# Patient Record
Sex: FEMALE | Race: WHITE
[De-identification: names, ages, dates, MRNs, and addresses within clinical notes are randomized per-mention and may not be internally consistent; named-entity substitution may affect disease eponyms.]

---

## 2019-04-09 ENCOUNTER — HOSPITAL ENCOUNTER (EMERGENCY)
Dept: HOSPITAL 47 - EC | Age: 24
Discharge: HOME | End: 2019-04-09
Payer: COMMERCIAL

## 2019-04-09 VITALS — SYSTOLIC BLOOD PRESSURE: 118 MMHG | DIASTOLIC BLOOD PRESSURE: 70 MMHG | TEMPERATURE: 98 F | HEART RATE: 88 BPM

## 2019-04-09 VITALS — RESPIRATION RATE: 16 BRPM

## 2019-04-09 DIAGNOSIS — Z3A.08: ICD-10-CM

## 2019-04-09 DIAGNOSIS — O20.0: Primary | ICD-10-CM

## 2019-04-09 LAB
APTT BLD: 26 SEC (ref 22–30)
BASOPHILS # BLD AUTO: 0 K/UL (ref 0–0.2)
BASOPHILS NFR BLD AUTO: 0 %
EOSINOPHIL # BLD AUTO: 0.2 K/UL (ref 0–0.7)
EOSINOPHIL NFR BLD AUTO: 2 %
ERYTHROCYTE [DISTWIDTH] IN BLOOD BY AUTOMATED COUNT: 4.26 M/UL (ref 3.8–5.4)
ERYTHROCYTE [DISTWIDTH] IN BLOOD: 13.2 % (ref 11.5–15.5)
HCT VFR BLD AUTO: 38.6 % (ref 34–46)
HGB BLD-MCNC: 13.1 GM/DL (ref 11.4–16)
INR PPP: 1 (ref ?–1.2)
LYMPHOCYTES # SPEC AUTO: 2.7 K/UL (ref 1–4.8)
LYMPHOCYTES NFR SPEC AUTO: 33 %
MCH RBC QN AUTO: 30.6 PG (ref 25–35)
MCHC RBC AUTO-ENTMCNC: 33.8 G/DL (ref 31–37)
MCV RBC AUTO: 90.6 FL (ref 80–100)
MONOCYTES # BLD AUTO: 0.5 K/UL (ref 0–1)
MONOCYTES NFR BLD AUTO: 6 %
NEUTROPHILS # BLD AUTO: 4.7 K/UL (ref 1.3–7.7)
NEUTROPHILS NFR BLD AUTO: 57 %
PH UR: 6 [PH] (ref 5–8)
PLATELET # BLD AUTO: 209 K/UL (ref 150–450)
PT BLD: 10.3 SEC (ref 9–12)
SP GR UR: 1.02 (ref 1–1.03)
UROBILINOGEN UR QL STRIP: <2 MG/DL (ref ?–2)
WBC # BLD AUTO: 8.2 K/UL (ref 3.8–10.6)

## 2019-04-09 PROCEDURE — 76801 OB US < 14 WKS SINGLE FETUS: CPT

## 2019-04-09 PROCEDURE — 86901 BLOOD TYPING SEROLOGIC RH(D): CPT

## 2019-04-09 PROCEDURE — 81025 URINE PREGNANCY TEST: CPT

## 2019-04-09 PROCEDURE — 99284 EMERGENCY DEPT VISIT MOD MDM: CPT

## 2019-04-09 PROCEDURE — 85730 THROMBOPLASTIN TIME PARTIAL: CPT

## 2019-04-09 PROCEDURE — 85610 PROTHROMBIN TIME: CPT

## 2019-04-09 PROCEDURE — 81003 URINALYSIS AUTO W/O SCOPE: CPT

## 2019-04-09 PROCEDURE — 84702 CHORIONIC GONADOTROPIN TEST: CPT

## 2019-04-09 PROCEDURE — 86900 BLOOD TYPING SEROLOGIC ABO: CPT

## 2019-04-09 PROCEDURE — 87086 URINE CULTURE/COLONY COUNT: CPT

## 2019-04-09 PROCEDURE — 85025 COMPLETE CBC W/AUTO DIFF WBC: CPT

## 2019-04-09 PROCEDURE — 76817 TRANSVAGINAL US OBSTETRIC: CPT

## 2019-04-09 PROCEDURE — 36415 COLL VENOUS BLD VENIPUNCTURE: CPT

## 2019-04-09 NOTE — ED
Female Urogenital HPI





- General


Chief complaint: Vaginal Bleeding


Stated complaint: 7wks preg, bleeding


Time Seen by Provider: 19 17:39


Source: patient, RN notes reviewed, old records reviewed


Mode of arrival: ambulatory


Limitations: no limitations





- History of Present Illness


Initial comments: 





This is a 23-year-old female the ER for evaluation.  Patient is a  believed

to be about 6-8 weeks pregnant.  Prior ultrasound.  Patient has had a prior 

ultrasound which did show what she believes to be a fetal heart rate up to 120s.

 Patient presented today for vaginal bleeding cramping that started today.


MD Complaint: vaginal bleeding (positive pregnancy)


-: hour(s)


Location: suprapubic (cramping)


Severity: mild


Severity scale (1-10): 2


Quality: cramping


Consistency: intermittent, now resolved


Worsens with: none


Last Menstrual Period: 19


Patient Pregnant: Yes


Associated Symptoms: vaginal bleeding, abdominal pain





- Related Data


                                Home Medications











 Medication  Instructions  Recorded  Confirmed


 


Pnv No.95/Ferrous Fum/Folic AC 1 tab PO DAILY 19





[Prenatal Multivitamin Tablet]   


 


metroNIDAZOLE 0.75% VAGINAL 1 applic VAGINAL HS 19





[Metrogel Vaginal]   











                                    Allergies











Allergy/AdvReac Type Severity Reaction Status Date / Time


 


No Known Allergies Allergy   Verified 19 17:45














Review of Systems


ROS Statement: 


Those systems with pertinent positive or pertinent negative responses have been 

documented in the HPI.





ROS Other: All systems not noted in ROS Statement are negative.





Past Medical History


Past Medical History: No Reported History


Additional Past Medical History / Comment(s): UTI


History of Any Multi-Drug Resistant Organisms: None Reported


Past Surgical History: No Surgical Hx Reported


Past Psychological History: Depression


Smoking Status: Never smoker


Past Alcohol Use History: None Reported


Past Drug Use History: None Reported





General Exam


Limitations: no limitations


General appearance: alert, in no apparent distress


Head exam: Present: atraumatic, normocephalic, normal inspection


Eye exam: Present: normal appearance, PERRL, EOMI.  Absent: scleral icterus, 

conjunctival injection, periorbital swelling


ENT exam: Present: normal exam, mucous membranes moist


Neck exam: Present: normal inspection.  Absent: tenderness, meningismus, 

lymphadenopathy


Respiratory exam: Present: normal lung sounds bilaterally.  Absent: respiratory 

distress, wheezes, rales, rhonchi, stridor


Cardiovascular Exam: Present: regular rate, normal rhythm, normal heart sounds. 

Absent: systolic murmur, diastolic murmur, rubs, gallop, clicks


GI/Abdominal exam: Present: soft, normal bowel sounds.  Absent: distended, 

tenderness, guarding, rebound, rigid


Extremities exam: Present: normal inspection, full ROM, normal capillary refill.

 Absent: tenderness, pedal edema, joint swelling, calf tenderness


Back exam: Present: normal inspection


Neurological exam: Present: alert, oriented X3, CN II-XII intact


Psychiatric exam: Present: normal affect, normal mood


Skin exam: Present: warm, dry, intact, normal color.  Absent: rash





Course


                                   Vital Signs











  19





  17:33


 


Temperature 98.1 F


 


Pulse Rate 95


 


Respiratory 16





Rate 


 


Blood Pressure 113/74


 


O2 Sat by Pulse 98





Oximetry 














Medical Decision Making





- Medical Decision Making





23 female the ER for evaluation of vaginal bleeding in pregnancy.  Patient can 

be discharged home positive IUP on ultrasound.  Labwork and UA negative





- Lab Data


Result diagrams: 


                                 19 18:12





                                   Lab Results











  19 Range/Units





  18:12 18:12 18:12 


 


WBC     (3.8-10.6)  k/uL


 


RBC     (3.80-5.40)  m/uL


 


Hgb     (11.4-16.0)  gm/dL


 


Hct     (34.0-46.0)  %


 


MCV     (80.0-100.0)  fL


 


MCH     (25.0-35.0)  pg


 


MCHC     (31.0-37.0)  g/dL


 


RDW     (11.5-15.5)  %


 


Plt Count     (150-450)  k/uL


 


Neutrophils %     %


 


Lymphocytes %     %


 


Monocytes %     %


 


Eosinophils %     %


 


Basophils %     %


 


Neutrophils #     (1.3-7.7)  k/uL


 


Lymphocytes #     (1.0-4.8)  k/uL


 


Monocytes #     (0-1.0)  k/uL


 


Eosinophils #     (0-0.7)  k/uL


 


Basophils #     (0-0.2)  k/uL


 


PT     (9.0-12.0)  sec


 


INR     (<1.2)  


 


APTT     (22.0-30.0)  sec


 


HCG, Quant    17705.3  mIU/mL


 


Urine Color     


 


Urine Appearance     (Clear)  


 


Urine pH     (5.0-8.0)  


 


Ur Specific Gravity     (1.001-1.035)  


 


Urine Protein     (Negative)  


 


Urine Glucose (UA)     (Negative)  


 


Urine Ketones     (Negative)  


 


Urine Blood     (Negative)  


 


Urine Nitrite     (Negative)  


 


Urine Bilirubin     (Negative)  


 


Urine Urobilinogen     (<2.0)  mg/dL


 


Ur Leukocyte Esterase     (Negative)  


 


Urine HCG, Qual   Detected   (Not Detectd)  


 


Blood Type  O Positive    


 


Blood Type Recheck  [facility] ONLY    














  19 Range/Units





  18:12 18:12 18:12 


 


WBC   8.2   (3.8-10.6)  k/uL


 


RBC   4.26   (3.80-5.40)  m/uL


 


Hgb   13.1   (11.4-16.0)  gm/dL


 


Hct   38.6   (34.0-46.0)  %


 


MCV   90.6   (80.0-100.0)  fL


 


MCH   30.6   (25.0-35.0)  pg


 


MCHC   33.8   (31.0-37.0)  g/dL


 


RDW   13.2   (11.5-15.5)  %


 


Plt Count   209   (150-450)  k/uL


 


Neutrophils %   57   %


 


Lymphocytes %   33   %


 


Monocytes %   6   %


 


Eosinophils %   2   %


 


Basophils %   0   %


 


Neutrophils #   4.7   (1.3-7.7)  k/uL


 


Lymphocytes #   2.7   (1.0-4.8)  k/uL


 


Monocytes #   0.5   (0-1.0)  k/uL


 


Eosinophils #   0.2   (0-0.7)  k/uL


 


Basophils #   0.0   (0-0.2)  k/uL


 


PT    10.3  (9.0-12.0)  sec


 


INR    1.0  (<1.2)  


 


APTT    26.0  (22.0-30.0)  sec


 


HCG, Quant     mIU/mL


 


Urine Color  Yellow    


 


Urine Appearance  Clear    (Clear)  


 


Urine pH  6.0    (5.0-8.0)  


 


Ur Specific Gravity  1.019    (1.001-1.035)  


 


Urine Protein  Negative    (Negative)  


 


Urine Glucose (UA)  Negative    (Negative)  


 


Urine Ketones  Negative    (Negative)  


 


Urine Blood  Negative    (Negative)  


 


Urine Nitrite  Negative    (Negative)  


 


Urine Bilirubin  Negative    (Negative)  


 


Urine Urobilinogen  <2.0    (<2.0)  mg/dL


 


Ur Leukocyte Esterase  Negative    (Negative)  


 


Urine HCG, Qual     (Not Detectd)  


 


Blood Type     


 


Blood Type Recheck     














- Radiology Data


Radiology results: report reviewed (US shows IUP), image reviewed





Disposition


Clinical Impression: 


 Threatened 





Disposition: HOME SELF-CARE


Condition: Good


Instructions (If sedation given, give patient instructions):  Threatened 

Miscarriage (ED)


Is patient prescribed a controlled substance at d/c from ED?: No


Referrals: 


Colton Aparicio Jr,  [Primary Care Provider] - 1-2 days

## 2019-04-09 NOTE — US
EXAMINATION TYPE: Transabdominal

 

DATE OF EXAM: 4/9/2019 7:04 PM

 

COMPARISON: NONE

 

CLINICAL HISTORY: pain. Spotting.

 

EXAM PERFORMED:  Transvaginal (TV) and Transabdominal (TA)

 

EXAM MEASUREMENTS:

 

GESTATIONAL AGE / DATING

Physician Established: 11/25/2019 7w 1day  

Dates by First Scan:  No previous this is first scan 

Dates by Current Scan for:  (7 weeks/1 days)  

** EDC: 11/25/2019

 

MATERNAL ANATOMY 

Uterus: 9.6 x 5.7 x 

Right Ovary: 2.7 x 1.8 x 1.9cm

Left Ovary: 3.1 x 1.3 x 1.6cm 

Post CDS / Adnexa: wnl

Presence of free fluid: no

Presence of corpus luteal cyst: Yes right 1.5 x 1.4 x 1.5cm. 

Presence of subchorionic bleed: Yes 1.8 x 1.6 x 1.8cm 

 

GESTATION / FETAL SURVEY

CRL: 1.09cm (7 weeks/1 days)

Yolk Sac (normal less than 6mm): 3mm

Heart Rate: 168 bpm

Rhythm:  Normal

IUP:  Viable IUP

 

Beta HcG (if available): Not available at this time

 

IMPRESSION:

SINGLE VIABLE IUP, WITH TINY SUBCHORIONIC HEMORRHAGE.

## 2019-07-01 ENCOUNTER — HOSPITAL ENCOUNTER (EMERGENCY)
Dept: HOSPITAL 47 - EC | Age: 24
Discharge: HOME | End: 2019-07-01
Payer: COMMERCIAL

## 2019-07-01 VITALS
HEART RATE: 84 BPM | RESPIRATION RATE: 18 BRPM | DIASTOLIC BLOOD PRESSURE: 73 MMHG | SYSTOLIC BLOOD PRESSURE: 120 MMHG | TEMPERATURE: 98.3 F

## 2019-07-01 DIAGNOSIS — N89.8: ICD-10-CM

## 2019-07-01 DIAGNOSIS — O23.42: Primary | ICD-10-CM

## 2019-07-01 DIAGNOSIS — Z3A.19: ICD-10-CM

## 2019-07-01 LAB
PH UR: 5.5 [PH] (ref 5–8)
RBC UR QL: 7 /HPF (ref 0–5)
SP GR UR: 1.03 (ref 1–1.03)
SQUAMOUS UR QL AUTO: 5 /HPF (ref 0–4)
UROBILINOGEN UR QL STRIP: <2 MG/DL (ref ?–2)

## 2019-07-01 PROCEDURE — 87086 URINE CULTURE/COLONY COUNT: CPT

## 2019-07-01 PROCEDURE — 81001 URINALYSIS AUTO W/SCOPE: CPT

## 2019-07-01 PROCEDURE — 99284 EMERGENCY DEPT VISIT MOD MDM: CPT

## 2019-07-01 NOTE — ED
Female Urogenital HPI





- General


Chief complaint: Urogenital


Stated complaint: Abd Pain-19 weeks pregnant


Time Seen by Provider: 19 21:24


Source: patient


Mode of arrival: ambulatory


Limitations: no limitations





- History of Present Illness


Initial comments: 


Mary is a  female, fairly 19 weeks pregnant.  Patient presents the 

emergency department today for evaluation of clear liquid leaking from her 

vagina.  Patient reports that 2 times today she has noticed clear liquid from 

her vagina she states that she's been wearing a pantiliners and has been able to

soak it 2 times which made her concerned.  Patient follows with Dr. Clemente and is

scheduled to see her in the morning for an ultrasound to determine the babies 

gender.  Patient reports that her last ultrasound was around 8 weeks at which 

time she confirmed a single intrauterine pregnancy.


The patient reports that her pregnancy has been complicated by a urinary tract 

infection, she was found to have Ureaplasma in her urine she was on antibiotics 

for 4 weeks and then it was determined by her OB that this is likely just normal

maximus for her and antibiotics could be discontinued.  Patient then developed a 

yeast infection and did start treatment for that with vaginal cream yesterday.








- Related Data


                                Home Medications











 Medication  Instructions  Recorded  Confirmed


 


Pnv No.95/Ferrous Fum/Folic AC 1 tab PO DAILY 19





[Prenatal Multivitamin Tablet]   











                                    Allergies











Allergy/AdvReac Type Severity Reaction Status Date / Time


 


No Known Allergies Allergy   Verified 19 21:47














Review of Systems


ROS Statement: 


Those systems with pertinent positive or pertinent negative responses have been 

documented in the HPI.





ROS Other: All systems not noted in ROS Statement are negative.





Past Medical History


Past Medical History: No Reported History


Additional Past Medical History / Comment(s): UTI


History of Any Multi-Drug Resistant Organisms: None Reported


Past Surgical History: No Surgical Hx Reported


Past Psychological History: Depression


Smoking Status: Never smoker


Past Alcohol Use History: None Reported


Past Drug Use History: None Reported





General Exam





- General Exam Comments


Initial Comments: 


Physical Exam


GENERAL:


Patient is well-developed and well-nourished.  


Patient is nontoxic and well-hydrated and is in no distress.





HENT:


Normocephalic, Atraumatic. 





EYES:


PERRL, EOMI





PULMONARY:


Unlabored respirations.  


No audible rales rhonchi or wheezing was noted.





CARDIOVASCULAR:


There is a regular rate and rhythm without any murmurs gallops or rubs.  





ABDOMEN:


Soft and nontender with normal bowel sounds. 


Gravid uterus at level of umbilicus





SKIN:


Skin is clear with no lesions or rashes and otherwise unremarkable.





: 


Deferred





NEUROLOGIC:


Patient is alert and oriented x3.  Moving all extremities spontaneously





MUSCULOSKELETAL:


Normal extremities with adequate strength and full range of motion.  No lower 

extremity swelling or edema.  No calf tenderness.  





PSYCHIATRIC:


Normal psychiatric evaluation





Limitations: no limitations





Course


                                   Vital Signs











  19





  21:06


 


Temperature 98.3 F


 


Pulse Rate 84


 


Respiratory 18





Rate 


 


Blood Pressure 120/73


 


O2 Sat by Pulse 99





Oximetry 














Medical Decision Making





- Medical Decision Making


Patient was seen and evaluated, history is obtained from the patient


Patient reports clear liquid leaking from the vagina she is 19 weeks pregnant 


A burn delivery nurse at bedside did bedside test which revealed the fluid 

leaking is not amniotic fluid find bedside ultrasound reveals an active fetus 

with heart rate in the 150s and adequate manic fluid noted excellent patient was

reassured patient is stable for discharge home she will be seen by her 

obstetrician Dr. Clemente tomorrow.





- Lab Data


                                   Lab Results











  19 Range/Units





  21:37 


 


Urine Color  Yellow  


 


Urine Appearance  Cloudy H  (Clear)  


 


Urine pH  5.5  (5.0-8.0)  


 


Ur Specific Gravity  1.029  (1.001-1.035)  


 


Urine Protein  Trace H  (Negative)  


 


Urine Glucose (UA)  Negative  (Negative)  


 


Urine Ketones  Negative  (Negative)  


 


Urine Blood  Trace H  (Negative)  


 


Urine Nitrite  Negative  (Negative)  


 


Urine Bilirubin  Negative  (Negative)  


 


Urine Urobilinogen  <2.0  (<2.0)  mg/dL


 


Ur Leukocyte Esterase  Large H  (Negative)  


 


Urine RBC  7 H  (0-5)  /hpf


 


Urine WBC  11 H  (0-5)  /hpf


 


Ur Squamous Epith Cells  5 H  (0-4)  /hpf


 


Urine Bacteria  Rare H  (None)  /hpf


 


Urine Mucus  Rare H  (None)  /hpf














Disposition


Clinical Impression: 


 Vaginal Discharge





Disposition: HOME SELF-CARE


Condition: Stable


Instructions (If sedation given, give patient instructions):  Vaginal Discharge 

(ED)


Is patient prescribed a controlled substance at d/c from ED?: No


Referrals: 


Colton Aparicio Jr,  [Primary Care Provider] - 1-2 days

## 2019-10-09 ENCOUNTER — HOSPITAL ENCOUNTER (OUTPATIENT)
Dept: HOSPITAL 47 - FBPOP | Age: 24
Setting detail: OBSERVATION
LOS: 1 days | Discharge: HOME | End: 2019-10-10
Attending: OBSTETRICS & GYNECOLOGY | Admitting: OBSTETRICS & GYNECOLOGY
Payer: COMMERCIAL

## 2019-10-09 VITALS — RESPIRATION RATE: 16 BRPM

## 2019-10-09 VITALS — BODY MASS INDEX: 34.1 KG/M2
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
## 2019-10-09 DIAGNOSIS — Z04.3: Primary | ICD-10-CM

## 2019-10-09 DIAGNOSIS — F32.9: ICD-10-CM
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
## 2019-10-09 DIAGNOSIS — Z87.440: ICD-10-CM

## 2019-10-09 DIAGNOSIS — O99.343: ICD-10-CM

## 2019-10-09 DIAGNOSIS — Z3A.33: ICD-10-CM

## 2019-10-09 PROCEDURE — 99213 OFFICE O/P EST LOW 20 MIN: CPT

## 2019-10-09 PROCEDURE — 59025 FETAL NON-STRESS TEST: CPT

## 2019-10-10 VITALS — TEMPERATURE: 98.2 F | DIASTOLIC BLOOD PRESSURE: 49 MMHG | HEART RATE: 74 BPM | SYSTOLIC BLOOD PRESSURE: 96 MMHG

## 2019-10-10 NOTE — P.HPOB
History of Present Illness


H&P Date: 10/10/19


Chief Complaint: IUP at 33 2/sevenths weeks, history of fall last evening





This is a pleasant 24-year-old  1 para 0 at 33-2/7 weeks that presented 

to labor and delivery last evening after falling on her side going downstairs 

well carrying laundry basket.  Patient was monitored overnight patient has noted

good fetal that she denies vaginal bleeding or contractions.  She has been on 

continuous monitoring overnight, fetal heart tones are noted to be category 1 

contractions were noted.  Other than the sore patient states she feels well





Review of Systems


Constitutional: Denies chills, Denies fatigue, Denies fever


Ears, nose, mouth and throat: Denies headache


Cardiovascular: Reports leg edema


Respiratory: Denies dyspnea


Gastrointestinal: Denies nausea


Genitourinary: Reports pregnant





Past Medical History


Past Medical History: No Reported History


Additional Past Medical History / Comment(s): UTI


History of Any Multi-Drug Resistant Organisms: None Reported


Past Surgical History: No Surgical Hx Reported


Past Anesthesia/Blood Transfusion Reactions: No Reported Reaction


Past Psychological History: Depression


Smoking Status: Never smoker


Past Alcohol Use History: None Reported


Past Drug Use History: None Reported





- Past Family History


  ** Father


History Unknown: Yes





Medications and Allergies


                                Home Medications











 Medication  Instructions  Recorded  Confirmed  Type


 


Pnv No.95/Ferrous Fum/Folic AC 1 tab PO DAILY 04/09/19 10/09/19 History





[Prenatal Multivitamin Tablet]    








                                    Allergies











Allergy/AdvReac Type Severity Reaction Status Date / Time


 


No Known Allergies Allergy   Verified 10/09/19 22:31














Exam


Osteopathic Statement: *.  No significant issues noted on an osteopathic 

structural exam other than those noted in the History and Physical/Consult.


                                   Vital Signs











  Temp Pulse Resp BP Pulse Ox


 


 10/10/19 08:00  98.2 F  74  16  96/49 


 


 10/09/19 23:30  97.7 F  98  16  117/67  99


 


 10/09/19 23:00  97.7 F  98  16  117/67  99


 


 10/09/19 22:37  97.7 F  101 H  16  117/67  98








                                Intake and Output











 10/09/19 10/10/19 10/10/19





 22:59 06:59 14:59


 


Other:   


 


  Weight 92.986 kg  














Targeted physical exam is performed in this date and generalist a well-nourished

well-developed pregnant female in no distress, breathing is noted to be 

nonlabored heart has regular rate and rhythm abdomen is noted to be gravid and 

nontender on exam she does have a bruise on her right shoulder from her fall.  

There is no other bruising noted.  Her abdomen is noted to be soft on palpation,

fetal heart tones are noted to be category 1 and no contractions are noted.





Assessment and Plan


(1) 33 weeks gestation of pregnancy


Current Visit: Yes   Status: Acute   Code(s): Z3A.33 - 33 WEEKS GESTATION OF 

PREGNANCY   SNOMED Code(s): 09690585


   





(2) Status post fall


Current Visit: Yes   Status: Acute   Code(s): Z91.81 - HISTORY OF FALLING   

SNOMED Code(s): 204312110


   


Plan: 





Patient did well overnight with no contractions or vaginal bleeding fetus is 

noted to be active, fetal heart tones returned be category 1.  We will plan 

discharge home this morning patient is to follow-up in the office on 

Monday/Tuesday for close follow-up.  Precautions are given to the patient 

regarding  labor.  All questions are answered and patient states 

understanding.

## 2019-11-14 ENCOUNTER — HOSPITAL ENCOUNTER (OUTPATIENT)
Dept: HOSPITAL 47 - FBPOP | Age: 24
Discharge: HOME | End: 2019-11-14
Attending: OBSTETRICS & GYNECOLOGY
Payer: COMMERCIAL

## 2019-11-14 VITALS
TEMPERATURE: 97.5 F | DIASTOLIC BLOOD PRESSURE: 63 MMHG | RESPIRATION RATE: 16 BRPM | HEART RATE: 93 BPM | SYSTOLIC BLOOD PRESSURE: 114 MMHG

## 2019-11-14 DIAGNOSIS — O47.1: Primary | ICD-10-CM

## 2019-11-14 DIAGNOSIS — Z3A.38: ICD-10-CM

## 2019-11-14 PROCEDURE — 99213 OFFICE O/P EST LOW 20 MIN: CPT

## 2019-11-14 PROCEDURE — 59025 FETAL NON-STRESS TEST: CPT

## 2019-11-18 ENCOUNTER — HOSPITAL ENCOUNTER (INPATIENT)
Dept: HOSPITAL 47 - 4FBP | Age: 24
LOS: 2 days | Discharge: HOME | End: 2019-11-20
Attending: OBSTETRICS & GYNECOLOGY | Admitting: OBSTETRICS & GYNECOLOGY
Payer: COMMERCIAL

## 2019-11-18 VITALS — BODY MASS INDEX: 35.9 KG/M2

## 2019-11-18 DIAGNOSIS — Z87.440: ICD-10-CM

## 2019-11-18 DIAGNOSIS — Z87.19: ICD-10-CM

## 2019-11-18 DIAGNOSIS — Z3A.39: ICD-10-CM

## 2019-11-18 DIAGNOSIS — Z86.59: ICD-10-CM

## 2019-11-18 DIAGNOSIS — Z79.899: ICD-10-CM

## 2019-11-18 LAB
BASOPHILS # BLD AUTO: 0 K/UL (ref 0–0.2)
BASOPHILS NFR BLD AUTO: 0 %
EOSINOPHIL # BLD AUTO: 0.1 K/UL (ref 0–0.7)
EOSINOPHIL NFR BLD AUTO: 2 %
ERYTHROCYTE [DISTWIDTH] IN BLOOD BY AUTOMATED COUNT: 4.03 M/UL (ref 3.8–5.4)
ERYTHROCYTE [DISTWIDTH] IN BLOOD: 13.7 % (ref 11.5–15.5)
HCT VFR BLD AUTO: 36.1 % (ref 34–46)
HGB BLD-MCNC: 12.3 GM/DL (ref 11.4–16)
LYMPHOCYTES # SPEC AUTO: 1.9 K/UL (ref 1–4.8)
LYMPHOCYTES NFR SPEC AUTO: 25 %
MCH RBC QN AUTO: 30.6 PG (ref 25–35)
MCHC RBC AUTO-ENTMCNC: 34.1 G/DL (ref 31–37)
MCV RBC AUTO: 89.5 FL (ref 80–100)
MONOCYTES # BLD AUTO: 0.4 K/UL (ref 0–1)
MONOCYTES NFR BLD AUTO: 6 %
NEUTROPHILS # BLD AUTO: 5 K/UL (ref 1.3–7.7)
NEUTROPHILS NFR BLD AUTO: 66 %
PLATELET # BLD AUTO: 161 K/UL (ref 150–450)
WBC # BLD AUTO: 7.6 K/UL (ref 3.8–10.6)

## 2019-11-18 PROCEDURE — 10907ZC DRAINAGE OF AMNIOTIC FLUID, THERAPEUTIC FROM PRODUCTS OF CONCEPTION, VIA NATURAL OR ARTIFICIAL OPENING: ICD-10-PCS

## 2019-11-18 PROCEDURE — 86850 RBC ANTIBODY SCREEN: CPT

## 2019-11-18 PROCEDURE — 3E033VJ INTRODUCTION OF OTHER HORMONE INTO PERIPHERAL VEIN, PERCUTANEOUS APPROACH: ICD-10-PCS

## 2019-11-18 PROCEDURE — 3E0R3BZ INTRODUCTION OF ANESTHETIC AGENT INTO SPINAL CANAL, PERCUTANEOUS APPROACH: ICD-10-PCS

## 2019-11-18 PROCEDURE — 00HU33Z INSERTION OF INFUSION DEVICE INTO SPINAL CANAL, PERCUTANEOUS APPROACH: ICD-10-PCS

## 2019-11-18 PROCEDURE — 86900 BLOOD TYPING SEROLOGIC ABO: CPT

## 2019-11-18 PROCEDURE — 0HQ9XZZ REPAIR PERINEUM SKIN, EXTERNAL APPROACH: ICD-10-PCS

## 2019-11-18 PROCEDURE — 85025 COMPLETE CBC W/AUTO DIFF WBC: CPT

## 2019-11-18 PROCEDURE — 86901 BLOOD TYPING SEROLOGIC RH(D): CPT

## 2019-11-18 RX ADMIN — IBUPROFEN PRN MG: 600 TABLET ORAL at 23:12

## 2019-11-18 RX ADMIN — POTASSIUM CHLORIDE SCH MLS/HR: 14.9 INJECTION, SOLUTION INTRAVENOUS at 06:32

## 2019-11-18 RX ADMIN — AMPICILLIN SODIUM SCH: 1 INJECTION, POWDER, FOR SOLUTION INTRAMUSCULAR; INTRAVENOUS at 18:51

## 2019-11-18 RX ADMIN — AMPICILLIN SODIUM SCH MLS/HR: 1 INJECTION, POWDER, FOR SOLUTION INTRAMUSCULAR; INTRAVENOUS at 10:52

## 2019-11-18 RX ADMIN — AMPICILLIN SODIUM SCH: 1 INJECTION, POWDER, FOR SOLUTION INTRAMUSCULAR; INTRAVENOUS at 18:50

## 2019-11-18 RX ADMIN — POTASSIUM CHLORIDE SCH MLS/HR: 14.9 INJECTION, SOLUTION INTRAVENOUS at 09:29

## 2019-11-18 NOTE — P.PROBDLV
Vaginal Delivery Note





- .


Vaginal Delivery Note: 





This is a pleasant 24-year-old  1 para 0 that presented to labor and 

delivery at 39-0/7 weeks for elective induction of labor.  Patient was known to 

be GBS positive otherwise prenatal care has been uncomplicated.  Patient was 

admitted and Pitocin induction of labor was begun per hospital protocol.  

Patient became uncomfortable and requested epidural placement soon after 

amniotomy was performed and clear fluid was obtained.  Epidural was placed by 

anesthesia without difficulty.  Patient progressed to complete began pushing and

had a normal spontaneous vaginal delivery of a viable male infant at 1308, 

weight of 8 pounds 4.6 ounces with Apgars of 9 and 9 at one and 5 minutes 

respectively.  After two-minute delayed the umbilical cord was doubly clamped 

and cut and the infant was handed off to mom.  The placenta was then delivered 

spontaneously intact with a three-vessel cord being noted.  On inspection the 

patient's vaginal vault a first-degree vaginal laceration was noted and this was

repaired in the usual fashion with 3-0 Rapide.  The uterus was noted to be firm 

and below the lump umbilicus at this time.  Estimated blood loss 300 mL.


On inspection the patient's laceration hemostasis was noted.  Patient and infant

tolerated delivery well and are resting comfortably.


All counts were correct 2

## 2019-11-18 NOTE — P.HPOB
History of Present Illness


H&P Date: 19


Chief Complaint: IUP at 39-0/7 weeks, induction of labor, gbs pos





This is a pleasant 24-year-old  1 para 0 at 39 0/7 weeks that presents to

labor and delivery for elective induction of labor.  Patient has been receiving 

routine prenatal care which has been uncomplicated.  Patient notes good fetal 

movement denies contractions or vaginal bleeding at this time.  She has a blood 

type of O+, rubella immune, hepatitis B surface antigen negative, HIV negative, 

RPR nonreactive, GBS is positive.





Review of Systems


Constitutional: Denies chills, Denies fatigue, Denies fever


Ears, nose, mouth and throat: Denies headache


Cardiovascular: Reports leg edema


Respiratory: Denies dyspnea


Gastrointestinal: Denies constipation, Denies diarrhea, Denies nausea, Denies 

vomiting


Genitourinary: Reports pregnant





Past Medical History


Past Medical History: No Reported History


Additional Past Medical History / Comment(s): UTI, IBS


History of Any Multi-Drug Resistant Organisms: None Reported


Past Surgical History: No Surgical Hx Reported


Past Anesthesia/Blood Transfusion Reactions: No Reported Reaction


Past Psychological History: Anxiety, Depression


Smoking Status: Never smoker


Past Alcohol Use History: None Reported


Past Drug Use History: None Reported





- Past Family History


  ** Father


History Unknown: Yes


Additional Family Medical History / Comment(s): None reported per pt





Medications and Allergies


                                Home Medications











 Medication  Instructions  Recorded  Confirmed  Type


 


Pnv No.95/Ferrous Fum/Folic AC 1 tab PO DAILY 19 History





[Prenatal Multivitamin Tablet]    








                                    Allergies











Allergy/AdvReac Type Severity Reaction Status Date / Time


 


No Known Allergies Allergy   Verified 19 06:10














Exam


Osteopathic Statement: *.  No significant issues noted on an osteopathic 

structural exam other than those noted in the History and Physical/Consult.


                                   Vital Signs











  Temp Pulse Resp BP


 


 19 06:09  98.8 F  96  16  111/65








                                Intake and Output











 19





 22:59 06:59 14:59


 


Other:   


 


  Weight  97.976 kg 














Targeted physical exam is performed in this date and generalist a well-nourished

well-developed pregnant female in no acute distress, breathing is noted to 

nonlabored her heart has a regular rate and rhythm, abdomen is gravid and 

appropriate for gestational age, on cervical exam she is 3/90/-2 amniotomy is 

performed and clear fluid was obtained.  Fetal heart tones returned be category 

1 she is shoaib every 3 minutes.





Results


Result Diagrams: 


                                 19 06:05








Assessment and Plan


(1) 39 weeks gestation of pregnancy


Current Visit: Yes   Status: Acute   Code(s): Z3A.39 - 39 WEEKS GESTATION OF 

PREGNANCY   SNOMED Code(s): 22636244


   





(2) Positive GBS test


Current Visit: Yes   Status: Acute   Code(s): B95.1 - STREPTOCOCCUS, GROUP B, 

CAUSING DISEASES CLASSD [facility]   SNOMED Code(s): 162800591


   


Plan: 





Patient is admitted to labor and delivery for Pitocin induction of labor.  

Pitocin induction is started per hospital protocol.  Patient does request 

epidural placement once uncomfortable, anesthesia is notified.  Anticipate 

spontaneous vaginal delivery later today.

## 2019-11-19 LAB
BASOPHILS # BLD AUTO: 0 K/UL (ref 0–0.2)
BASOPHILS NFR BLD AUTO: 0 %
EOSINOPHIL # BLD AUTO: 0.1 K/UL (ref 0–0.7)
EOSINOPHIL NFR BLD AUTO: 2 %
ERYTHROCYTE [DISTWIDTH] IN BLOOD BY AUTOMATED COUNT: 3.36 M/UL (ref 3.8–5.4)
ERYTHROCYTE [DISTWIDTH] IN BLOOD: 13.7 % (ref 11.5–15.5)
HCT VFR BLD AUTO: 30.6 % (ref 34–46)
HGB BLD-MCNC: 10.4 GM/DL (ref 11.4–16)
LYMPHOCYTES # SPEC AUTO: 1.5 K/UL (ref 1–4.8)
LYMPHOCYTES NFR SPEC AUTO: 15 %
MCH RBC QN AUTO: 30.9 PG (ref 25–35)
MCHC RBC AUTO-ENTMCNC: 33.9 G/DL (ref 31–37)
MCV RBC AUTO: 91.3 FL (ref 80–100)
MONOCYTES # BLD AUTO: 0.4 K/UL (ref 0–1)
MONOCYTES NFR BLD AUTO: 4 %
NEUTROPHILS # BLD AUTO: 7.6 K/UL (ref 1.3–7.7)
NEUTROPHILS NFR BLD AUTO: 77 %
PLATELET # BLD AUTO: 129 K/UL (ref 150–450)
WBC # BLD AUTO: 9.8 K/UL (ref 3.8–10.6)

## 2019-11-19 RX ADMIN — POTASSIUM CHLORIDE SCH: 14.9 INJECTION, SOLUTION INTRAVENOUS at 00:48

## 2019-11-19 RX ADMIN — DOCUSATE SODIUM AND SENNOSIDES SCH: 50; 8.6 TABLET ORAL at 09:05

## 2019-11-19 RX ADMIN — DOCUSATE SODIUM AND SENNOSIDES SCH EACH: 50; 8.6 TABLET ORAL at 11:19

## 2019-11-19 RX ADMIN — IBUPROFEN PRN MG: 600 TABLET ORAL at 11:20

## 2019-11-19 RX ADMIN — IBUPROFEN PRN MG: 600 TABLET ORAL at 17:36

## 2019-11-19 RX ADMIN — IBUPROFEN PRN MG: 600 TABLET ORAL at 05:29

## 2019-11-19 RX ADMIN — DOCUSATE SODIUM AND SENNOSIDES SCH: 50; 8.6 TABLET ORAL at 00:48

## 2019-11-19 NOTE — P.PNOBGVD
Subjective





- Subjective


Principal diagnosis: PPD 1 


Interval history: 





Patient is doing well postpartum, she is ambulating voiding without difficulty. 

Lochia is minimal.  She denies discomfort and states Motrin is helping with her 

cramping.  She is breast-feeding but notes some difficulty


Patient reports: Reports appetite normal, Reports voiding normally, Reports pain

well controlled, Reports ambulating normally


: doing well





Objective





- Latest Vital Signs


Latest vital signs: 


                                   Vital Signs











  Temp Pulse Resp BP


 


 19 04:00  97.6 F  88  16  112/56


 


 19 00:00  97.9 F  89  16  103/66


 


 19 20:00  98.1 F  90  18  107/52


 


 19 15:58  97.7 F   


 


 19 15:20   96  18  112/61


 


 19 14:50   103 H  18  109/56


 


 19 14:20   107 H  16  112/55


 


 19 14:05   104 H  18  108/67


 


 19 13:50   96   113/67


 


 19 13:35   105 H  18  114/60


 


 19 13:20   111 H  18  109/64








                                Intake and Output











 19





 22:59 06:59 14:59


 


Intake Total  500 


 


Balance  500 


 


Intake:   


 


  Oral  500 


 


Other:   


 


  # Voids  2 














- Exam


Extremities: Present: normal


Abdomen: Present: normal appearance, soft


Uterus: Present: normal, firm





- Labs


Labs: 


                  Abnormal Lab Results - Last 24 Hours (Table)











  19 Range/Units





  07:22 


 


RBC  3.36 L  (3.80-5.40)  m/uL


 


Hgb  10.4 L  (11.4-16.0)  gm/dL


 


Hct  30.6 L  (34.0-46.0)  %


 


Plt Count  129 L  (150-450)  k/uL














Assessment and Plan


(1) 39 weeks gestation of pregnancy


Current Visit: Yes   Status: Acute   Code(s): Z3A.39 - 39 WEEKS GESTATION OF 

PREGNANCY   SNOMED Code(s): 45010692


   





(2) Positive GBS test


Current Visit: Yes   Status: Acute   Code(s): B95.1 - STREPTOCOCCUS, GROUP B, 

CAUSING DISEASES CLASSD [facility]   SNOMED Code(s): 380779826


   





(3) Status post vaginal delivery


Current Visit: Yes   Status: Acute   Code(s): UQE3967 -    SNOMED Code(s): 

056814697


   


Plan: 





Patient is doing well postpartum, will continue routine postpartum care and 

anticipate discharge home tomorrow

## 2019-11-20 VITALS
SYSTOLIC BLOOD PRESSURE: 105 MMHG | DIASTOLIC BLOOD PRESSURE: 64 MMHG | TEMPERATURE: 97.7 F | HEART RATE: 85 BPM | RESPIRATION RATE: 16 BRPM

## 2019-11-20 RX ADMIN — DOCUSATE SODIUM AND SENNOSIDES SCH: 50; 8.6 TABLET ORAL at 07:57

## 2019-11-20 RX ADMIN — IBUPROFEN PRN MG: 600 TABLET ORAL at 04:39

## 2019-11-20 NOTE — P.DS
Providers


Date of admission: 


19 05:54





Expected date of discharge: 19


Attending physician: 


Carol Clemente





Primary care physician: 


Carol Clemente








- Discharge Diagnosis(es)


(1) 39 weeks gestation of pregnancy


Current Visit: Yes   Status: Acute   





(2) Positive GBS test


Current Visit: Yes   Status: Acute   





(3) Status post vaginal delivery


Current Visit: Yes   Status: Acute   


Hospital Course: 





This is a pleasant 24-year-old  1 now para 1 that presented to labor and 

delivery on 19 for elective induction of labor.  Patient was admitted and 

Pitocin induction of labor was begun per hospital protocol.  For further details

on this patient please see the dictated H&P.  Patient underwent amniotomy clear 

fluid was obtained during labor.  Patient did become uncomfortable requesting 

epidural placement during this process epidural was placed without difficulty by

the anesthesia department.  She progressed to complete began pushing and had 

normal spontaneous vaginal delivery of a viable male infant at 1308, weight of 8

pounds 4.6 ounces with Apgars of 9 and 9 at one and 5 minutes respectively.  

Patient did sustain a first-degree vaginal laceration was repaired in the usual 

fashion with 3-0 Rapide.  Patient's postpartum course has been uneventful.  She 

is ambulating and voiding without difficulty on this postpartum day #2.  She 

states her lochia is minimal.  She is breast-feeding.  She denies concerns and 

wishes discharge home





Plan - Discharge Summary


Discharge Rx Participant: No


New Discharge Prescriptions: 


No Action


   Pnv No.95/Ferrous Fum/Folic AC [Prenatal Multivitamin Tablet] 1 tab PO DAILY


Discharge Medication List





Pnv No.95/Ferrous Fum/Folic AC [Prenatal Multivitamin Tablet] 1 tab PO DAILY 

19 [History]








Follow up Appointment(s)/Referral(s): 


Carol Clemente DO [Primary Care Provider] - 4 Weeks


Patient Instructions/Handouts:  Vaginal Delivery (DC), Vaginal Delivery (GEN)


Activity/Diet/Wound Care/Special Instructions: 


No tub baths or intercourse until 6 weeks postpartum.


Discharge Disposition: HOME SELF-CARE

## 2019-12-02 NOTE — P.MSEPDOC
Presenting Problems





- Arrival Data


Date of Arrival on Unit: 19


Time of Arrival on Unit: 11:31


Mode of Transport: Ambulatory





- Complaint


OB-Reason for Admission/Chief Complaint: Possible Onset of Labor


Comment: pt feels crampiness, denies SROM denies problems with pregnancy





Prenatal Medical History





- Pregnancy Information


: 1


Para: 0


Term: 0


: 0


Abortions: Spontaneous or Elective: 0


Number of Living Children: 0





- Gestational Age


Gestational Age by ZURDO (wks/days): 38 Weeks and 3 Days





Review of Systems





- Review of Systems


Constitutional: No problems


Breast: No problems


ENT: No problems


Cardiovascular: No problems


Respiratory: No problems


Gastrointestinal: No problems


Genitourinary: No problems


Musculoskeletal: No problems


Neurological: No problems


Skin: No problems





Vital Signs





- Temperature


Temperature: 97.5 F


Temperature Source: Temporal Artery Scan





- Pulse


  ** Right Supine Ulnar


Pulse Rate: 93


Pulse Assessment Method: Automatic Cuff





- Respirations


Respiratory Rate: 16


Oxygen Delivery Method: Room Air


O2 Sat by Pulse Oximetry: 98





- Blood Pressure


  ** Right Arm


Blood Pressure: 114/63


Blood Pressure Mean: 80


Blood Pressure Source: Automatic Cuff





Medical Screen Scoring (Pre)





- Cervical Exam


Dilation: 1-3 cm = 1


Effacement: More than 50% = 2





- Uterine Contractions


Frequency: > or = 36 weeks =2


Intensity: N/A





- Maternal Vital Signs


Maternal Temperature: N/A


Maternal Blood Pressure: N/A


Signs of Preeclampsia: N/A





- Fetal Assessment - Baby A


Baseline FHR: 125


Fetal Heart Rate - NICHD Category: Category I (Normal) = 0


NST: Reactive


Fetal Position: N/A





- Total Score - Baby A


Total Score - Baby A: 5





- Total Score - Baby B


Total Score - Baby B: 5





- Total Score - Baby C


Total Score - Baby C: 5





- Level of Risk - Baby A


Level of Risk - Baby A: Low (0-5)





- Level of Risk - Baby B


Level of Risk - Baby B: Low (0-5)





- Level of Risk - Baby C


Level of Risk - Baby C: Low (0-5)





Physician Notification (Pre)





- Physician Notified


Physician Notified Date: 19


Physician Notified Time: 12:10


New Order Received: Yes





- Notification Comment


Comment: May discharge to home





Disposition





- Disposition


OB Disposition: Discharge to home


Discharge Date: 19


Discharge Time: 12:15


I agree with the RN Medical Screening Exam: Yes


Risk & Benefit of care provided described in d/c instruction: Yes


Diagnosis: FALSE LABOR AT OR AFTER 37 COMPLETED WEEKS OF GESTATION

## 2023-08-12 ENCOUNTER — HOSPITAL ENCOUNTER (EMERGENCY)
Dept: HOSPITAL 47 - EC | Age: 28
Discharge: HOME | End: 2023-08-12
Payer: COMMERCIAL

## 2023-08-12 VITALS
HEART RATE: 111 BPM | DIASTOLIC BLOOD PRESSURE: 72 MMHG | TEMPERATURE: 98 F | RESPIRATION RATE: 20 BRPM | SYSTOLIC BLOOD PRESSURE: 119 MMHG

## 2023-08-12 DIAGNOSIS — Z86.59: ICD-10-CM

## 2023-08-12 DIAGNOSIS — Z23: ICD-10-CM

## 2023-08-12 DIAGNOSIS — S70.351A: Primary | ICD-10-CM

## 2023-08-12 DIAGNOSIS — F12.90: ICD-10-CM

## 2023-08-12 DIAGNOSIS — S81.811A: ICD-10-CM
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
## 2023-08-12 PROCEDURE — 90715 TDAP VACCINE 7 YRS/> IM: CPT

## 2023-08-12 PROCEDURE — 90471 IMMUNIZATION ADMIN: CPT

## 2023-08-12 PROCEDURE — 99283 EMERGENCY DEPT VISIT LOW MDM: CPT

## 2023-08-12 PROCEDURE — 73560 X-RAY EXAM OF KNEE 1 OR 2: CPT

## 2023-08-12 PROCEDURE — 12002 RPR S/N/AX/GEN/TRNK2.6-7.5CM: CPT

## 2023-08-12 NOTE — ED
Wound/Laceration HPI





- General


Chief Complaint: Wound/Laceration


Stated Complaint: Right leg laceration


Time Seen by Provider: 08/12/23 12:41


Source: patient, RN notes reviewed


Mode of arrival: ambulatory


Limitations: no limitations





- History of Present Illness


Initial Comments: 


28-year-old female presents emergency from chief complaint of right leg lace

ration.  Patient states that she was put in a car seat in a vehicle states that 

she caught her leg on a mehul fender well.  Patient states that she can see 

metal flakes.  Patient states that she is unsure when her last tetanus was.  

Patient denies any fevers or chills.  Patient has any paresthesias.








- Related Data


                                Home Medications











 Medication  Instructions  Recorded  Confirmed


 


Pnv No.95/Ferrous Fum/Folic AC 1 tab PO DAILY 04/09/19 11/19/19





[Prenatal Multivitamin Tablet]   








                                  Previous Rx's











 Medication  Instructions  Recorded


 


Cephalexin [Keflex] 500 mg PO Q6HR #28 cap 08/12/23











                                    Allergies











Allergy/AdvReac Type Severity Reaction Status Date / Time


 


No Known Allergies Allergy   Verified 08/12/23 12:34














Review of Systems


ROS Statement: 


Those systems with pertinent positive or pertinent negative responses have been 

documented in the HPI.





ROS Other: All systems not noted in ROS Statement are negative.





Past Medical History


Past Medical History: No Reported History


Additional Past Medical History / Comment(s): UTI, IBS


History of Any Multi-Drug Resistant Organisms: None Reported


Past Surgical History: No Surgical Hx Reported


Past Anesthesia/Blood Transfusion Reactions: No Reported Reaction


Past Psychological History: Anxiety, Depression


Smoking Status: Never smoker


Past Alcohol Use History: Occasional


Past Drug Use History: Marijuana





- Past Family History


  ** Father


History Unknown: Yes


Additional Family Medical History / Comment(s): None reported per pt





General Exam


Limitations: no limitations


General appearance: alert, in no apparent distress


Head exam: Present: atraumatic, normocephalic, normal inspection


Eye exam: Present: normal appearance, PERRL, EOMI.  Absent: scleral icterus, 

conjunctival injection, periorbital swelling


Respiratory exam: Present: normal lung sounds bilaterally.  Absent: respiratory 

distress, wheezes, rales, rhonchi, stridor


Cardiovascular Exam: Present: regular rate, normal rhythm, normal heart sounds. 

 Absent: systolic murmur, diastolic murmur, rubs, gallop, clicks


Extremities exam: Present: other (Right leg there is a 3 cm laceration with 

multiple foreign bodies noted)


Neurological exam: Present: alert


Skin exam: Present: warm, dry, intact, normal color.  Absent: rash





Course





                                   Vital Signs











  08/12/23





  12:32


 


Temperature 98 F


 


Pulse Rate 111 H


 


Respiratory 20





Rate 


 


Blood Pressure 119/72


 


O2 Sat by Pulse 99





Oximetry 














Procedures





- Laceration


  ** Laceration #1


Consent Obtained: verbal consent


Indication: laceration


Site: lower extremity (Right leg)


Size (cm): 3


Description: irregular, contaminated, foreign body


Depth: simple, single layer


Anesthetic Used: lidocaine 1%, without epi


Anesthesia Technique: local infiltration


Amount (mls): 8


Pre-repair: wound explored, irrigated extensively, deep structures intact, 

foreign body removed


Type of Sutures: nylon


Size of Sutures: 4-0


Number of Sutures: 8


Technique: simple, interrupted


Patient Tolerated Procedure: well, no complications





Medical Decision Making





- Medical Decision Making





Was pt. sent in by a medical professional or institution (MANJU Aguirre, NP, urgent 

care, hospital, or nursing home...) When possible be specific


@  -No


Did you speak to anyone other than the patient for history (EMS, parent, family,

 police, friend...)? What history was obtained from this source 


@  -No


Did you review nursing and triage notes (agree or disagree)?  Why? 


@  -I reviewed and agree with nursing and triage notes


Were old charts reviewed (outside hosp., previous admission, EMS record, old 

EKG, old radiological studies, urgent care reports/EKG's, nursing home records)?

 Report findings 


@  -No old charts were reviewed


Differential Diagnosis (chest pain, altered mental status, abdominal pain women,

 abdominal pain men, vaginal bleeding, weakness, fever, dyspnea, syncope, 

headache, dizziness, GI bleed, back pain, seizure, CVA, palpatations, mental 

health, musculoskeletal)? 


@  -Laceration, foreign body,


EKG interpreted by me (3pts min.).


@  -[None


X-rays interpreted by me (1pt min.).


@  -X-ray right knee limited shows for multiple foreign bodies, laceration


CT interpreted by me (1pt min.).


@  -None done


U/S interpreted by me (1pt. min.).


@  -None done


What testing was considered but not performed or refused? (CT, X-rays, U/S, 

labs)? Why?


@  -None


What meds were considered but not given or refused? Why?


@  -None


Did you discuss the management of the patient with other professionals 

(professionals i.e. , PA, NP, lab, RT, psych nurse, , , 

teacher, , )? Give summary


@  -No


Was smoking cessation discussed for >3mins.?


@  -No


Was critical care preformed (if so, how long)?


@  -No


Were there social determinants of health that impacted care today? How? 

(Homelessness, low income, unemployed, alcoholism, drug addiction, transportatio

n, low edu. Level, literacy, decrease access to med. care, nursing home, rehab)?


@  -No


Was there de-escalation of care discussed even if they declined (Discuss DNR or 

withdrawal of care, Hospice)? DNR status


@  -No


What co-morbidities impacted this encounter? (DM, HTN, Smoking, COPD, CAD, 

Cancer, CVA, ARF, Chemo, Hep., AIDS, mental health diagnosis, sleep apnea, 

morbid obesity)?


@  -None


Was patient admitted / discharged? Hospital course, mention meds given and 

route, prescriptions, significant lab abnormalities, going to OR and other 

pertinent info.


@  -Discharge patient had laceration repair, multiple foreign bodies were 

removed tetanus is updated.  We discussed possibility of small pieces remaining 

patient will be discharged with close follow-up on oral antibiotics.  Patient 

provided orthopedics. 


Undiagnosed new problem with uncertain prognosis?


@  -No


Drug Therapy requiring intensive monitoring for toxicity (Heparin, Nitro, 

Insulin, Cardizem)?


@  -No


Were any procedures done?


@  -No


Diagnosis/symptom?


@  -Right leg laceration, foreign body


Acute, or Chronic, or Acute on Chronic?


@  -Acute


Uncomplicated (without systemic symptoms) or Complicated (systemic symptoms)?


@  -Uncomplicated


Side effects of treatment?


@  -No


Exacerbation, Progression, or Severe Exacerbation?


@  -No


Poses a threat to life or bodily function? How? (Chest pain, USA, MI, pneumonia,

 PE, COPD, DKA, ARF, appy, cholecystitis, CVA, Diverticulitis, Homicidal, 

Suicidal, threat to staff... and all critical care pts)


@  -No





Disposition


Clinical Impression: 


 Laceration of right lower leg, Foreign body of right thigh





Disposition: HOME SELF-CARE


Condition: Stable


Instructions (If sedation given, give patient instructions):  Care For Your 

Stitches (ED), Laceration (ED), Soft Tissue Foreign Body (ED)


Additional Instructions: 


Follow-up with orthopedics as needed.  Have sutures removed in 10-14 days.  

Please return to the Emergency Department if symptoms worsen or any other 

concerns.


Prescriptions: 


Cephalexin [Keflex] 500 mg PO Q6HR #28 cap


Is patient prescribed a controlled substance at d/c from ED?: No


Referrals: 


Colton Aparicio Jr, DO [Primary Care Provider] - 1-2 days


Rupal Fountain DO [Doctor of Osteopathic Medicine] - 1-2 days


Time of Disposition: 13:35

## 2023-08-12 NOTE — XR
EXAMINATION TYPE: XR knee limited RT

 

DATE OF EXAM: 8/12/2023

 

CLINICAL HISTORY: pain

 

TECHNIQUE:  Three views of the right knee are obtained.

 

COMPARISON: None.

 

FINDINGS:  There is no acute fracture/dislocation.  The tri-compartment joint spaces appear within no
rmal limits. Multiple suprapatellar subcutaneous radiopaque densities compatible with foreign body.

 

IMPRESSION: Soft tissue foreign body is noted.

## 2024-12-03 ENCOUNTER — HOSPITAL ENCOUNTER (OUTPATIENT)
Dept: HOSPITAL 47 - RADUSWWP | Age: 29
Discharge: HOME | End: 2024-12-03
Attending: FAMILY MEDICINE
Payer: COMMERCIAL

## 2024-12-03 DIAGNOSIS — Z3A.08: ICD-10-CM

## 2024-12-03 DIAGNOSIS — Z34.90: Primary | ICD-10-CM

## 2024-12-03 PROCEDURE — 76801 OB US < 14 WKS SINGLE FETUS: CPT

## 2024-12-03 NOTE — US
EXAMINATION TYPE: Transabdominal

 

DATE OF EXAM: 12/3/2024 10:38 AM

 

COMPARISON: None

 

CLINICAL INDICATION: Female, 29 years old with history of Z34.90 ENCNTR FOR SUPRVSN OF NORMAL PREGNAN
CY; Dates

 

TECHNIQUE: Transabdominal (TA) with grayscale and color Doppler imaging including first trimester pre
gnancy.

 

FINDINGS:

 

EXAM MEASUREMENTS:

 

GESTATIONAL AGE / DATING

 

Physician Established: Not yet established Dates by LMP:  10/01/2024 (9 weeks/0 days)  

** EDC: 07/08/2025

Dates by First Scan:  No previous this is first scan 

Dates by Current Scan for: (8 weeks/1 days)  

** EDC: 07/17/2025

 

MATERNAL ANATOMY 

 

Uterus: 10.2 x 7.2 x 7.2 cm 

Right Ovary: 3.5 x 2.4 x 2.2 cm

Left Ovary: Obscured by bowel gas. 

Post CDS / Adnexa: wnl

Presence of free fluid: no

Presence of corpus luteal cyst: no

Presence of subchorionic bleed: no

 

GESTATION / FETAL SURVEY

CRL: 0.16 cm (8 weeks/1 days)

Yolk Sac (normal less than 6mm): 3 mm

Heart Rate: 163 bpm

Rhythm:  Normal

IUP:  Viable IUP

 

 

Beta HcG (if available): Not available at this time

 

 

 

 

 

IMPRESSION:

Single live intrauterine pregnancy with calculated ultrasound age of 8 weeks 1 day with estimated sho
e of delivery of 7/17/2025.

 

 

 

 

 

 

[facility], Workstation: [id number], 12/3/2024 10:50 AM

## 2025-06-04 ENCOUNTER — HOSPITAL ENCOUNTER (OUTPATIENT)
Dept: HOSPITAL 47 - FBPOP | Age: 30
Discharge: HOME | End: 2025-06-04
Attending: OBSTETRICS & GYNECOLOGY
Payer: COMMERCIAL

## 2025-06-04 VITALS
HEART RATE: 100 BPM | RESPIRATION RATE: 16 BRPM | SYSTOLIC BLOOD PRESSURE: 130 MMHG | DIASTOLIC BLOOD PRESSURE: 71 MMHG | TEMPERATURE: 97 F

## 2025-06-04 DIAGNOSIS — Z3A.35: ICD-10-CM

## 2025-06-04 DIAGNOSIS — O26.893: Primary | ICD-10-CM

## 2025-06-04 DIAGNOSIS — M79.89: ICD-10-CM

## 2025-06-04 LAB
AMORPH SED URNS QL MICRO: (no result) /HPF
APPEARANCE UR: (no result)
BACTERIA UR QL AUTO: (no result) /HPF
BILIRUB UR QL CFM: (no result)
BILIRUB UR QL STRIP.AUTO: NEGATIVE
BROAD CASTS [PRESENCE] IN URINE BY COMPUTER ASSISTED METHOD: (no result) /LPF
CA CARBONATE CRY #/AREA URNS HPF: (no result) /HPF
CA PHOS CRY UR QL COMP ASSIST: (no result) /HPF
CAOX CRY UR QL COMP ASSIST: (no result) /HPF
CASTS URNS QL MICRO: (no result) /LPF
COLOR UR: COLORLESS
CRYSTALS UR QL: (no result) /HPF
CYSTINE CRY #/AREA URNS HPF: (no result) /HPF
EPITHELIAL CASTS [PRESENCE] IN URINE BY COMPUTER ASSISTED METHOD: (no result) /LPF
ERYTHROCYTE CLUMPS [PRESENCE] IN URINE BY COMPUTER ASSISTED METHOD: (no result) /HPF
FATTY CASTS #/AREA UR COMP ASSIST: (no result) /LPF
GLUCOSE UR QL: NEGATIVE
GRAN CASTS UR QL COMP ASSIST: (no result) /LPF
HYALINE CASTS UR QL AUTO: (no result) /LPF (ref 0–2)
KETONES UR QL STRIP.AUTO: NEGATIVE
LEUCINE CRYSTALS [PRESENCE] IN URINE BY COMPUTER ASSISTED METHOD: (no result) /HPF
LEUKOCYTE ESTERASE UR QL STRIP.AUTO: (no result)
MIXED CELL CASTS UR QL COMP ASSIST: (no result) /LPF
MUCOUS THREADS UR QL AUTO: (no result) /HPF
NITRITE UR QL STRIP.AUTO: NEGATIVE
OVAL FAT BODIES #/AREA UR COMP ASSIST: (no result) /HPF
PH UR: 6.5 [PH] (ref 5–8)
PROT UR QL SSA: (no result)
PROT UR QL: NEGATIVE
RBC CASTS UR QL COMP ASSIST: (no result) /LPF
RBC UR QL: 1 /HPF (ref 0–5)
RBC UR QL: NEGATIVE
RENAL EPI CELLS UR QL COMP ASSIST: (no result) /HPF
SP GR UR: 1.01 (ref 1–1.03)
SPERM # UR AUTO: (no result) /HPF
SQUAMOUS UR QL AUTO: 14 /HPF (ref 0–4)
TRANS CELLS UR QL COMP ASSIST: (no result) /HPF (ref 0–1)
TRI-PHOS CRY UR QL COMP ASSIST: (no result) /HPF
TRICHOMONAS UR QL COMP ASSIST: (no result) /HPF
TYROSINE CRYSTALS [PRESENCE] IN URINE BY COMPUTER ASSISTED METHOD: (no result) /HPF
URATE CRY UR QL COMP ASSIST: (no result) /HPF
UROBILINOGEN UR QL STRIP: <2 MG/DL (ref ?–2)
WAXY CASTS #/AREA UR COMP ASSIST: (no result) /LPF
WBC #/AREA URNS HPF: 12 /HPF (ref 0–5)
WBC CASTS #/AREA URNS LPF: (no result) /LPF
WBC CLUMPS UR QL AUTO: (no result) /HPF
YEAST BUDDING UR QL COMP ASSIST: (no result) /HPF
YEAST HYPHAE UR QL COMP ASSIST: (no result) /HPF

## 2025-06-04 PROCEDURE — 59025 FETAL NON-STRESS TEST: CPT

## 2025-06-04 PROCEDURE — 81001 URINALYSIS AUTO W/SCOPE: CPT

## 2025-06-04 PROCEDURE — 99213 OFFICE O/P EST LOW 20 MIN: CPT

## 2025-07-02 ENCOUNTER — HOSPITAL ENCOUNTER (INPATIENT)
Dept: HOSPITAL 47 - 4FBP | Age: 30
LOS: 1 days | Discharge: HOME | End: 2025-07-03
Attending: OBSTETRICS & GYNECOLOGY | Admitting: OBSTETRICS & GYNECOLOGY
Payer: COMMERCIAL

## 2025-07-02 VITALS — RESPIRATION RATE: 16 BRPM

## 2025-07-02 DIAGNOSIS — Z3A.39: ICD-10-CM

## 2025-07-02 DIAGNOSIS — F41.9: ICD-10-CM

## 2025-07-02 DIAGNOSIS — F32.A: ICD-10-CM

## 2025-07-02 LAB
ANISOCYTOSIS BLD QL SMEAR: (no result)
BASO STIPL BLD QL SMEAR: (no result)
BASOPHILS # BLD AUTO: 0.04 10*3/UL (ref 0–0.1)
BASOPHILS NFR BLD AUTO: 0.4 %
BURR CELLS BLD QL SMEAR: (no result)
DACRYOCYTES BLD QL SMEAR: (no result)
DOHLE BOD BLD QL SMEAR: (no result)
EOSINOPHIL # BLD AUTO: 0.15 10*3/UL (ref 0.04–0.35)
EOSINOPHIL NFR BLD AUTO: 1.4 %
ERYTHROCYTE [DISTWIDTH] IN BLOOD BY AUTOMATED COUNT: 3.81 10*6/UL (ref 4.1–5.2)
ERYTHROCYTE [DISTWIDTH] IN BLOOD: 14.5 % (ref 11.5–14.5)
HCT VFR BLD AUTO: 33.7 % (ref 37.2–46.3)
HGB BLD-MCNC: 11.6 G/DL (ref 12–15)
HOWELL-JOLLY BOD BLD QL SMEAR: (no result)
HYPOCHROMIA BLD QL SMEAR: (no result)
IMM GRANULOCYTES # BLD: 0.1 10*3/UL (ref 0–0.04)
LG PLATELETS BLD QL SMEAR: (no result)
LYMPHOCYTES # SPEC AUTO: 2.32 10*3/UL (ref 0.9–5)
LYMPHOCYTES NFR SPEC AUTO: 21.7 %
Lab: (no result)
MCH RBC QN AUTO: 30.4 PG (ref 27–32)
MCHC RBC AUTO-ENTMCNC: 34.4 G/DL (ref 32–37)
MCV RBC AUTO: 88.5 FL (ref 80–97)
MONOCYTES # BLD AUTO: 0.83 10*3/UL (ref 0.2–1)
MONOCYTES NFR BLD AUTO: 7.8 %
NEUTROPHILS # BLD AUTO: 7.26 10*3/UL (ref 1.8–7.7)
NEUTROPHILS NFR BLD AUTO: 67.8 %
NEUTS HYPERSEG # BLD: (no result) 10*3/UL
NRBC BLD AUTO-RTO: (no result) %
OVALOCYTES BLD QL SMEAR: (no result)
PELGER HUET CELLS BLD QL SMEAR: (no result)
PLATELET # BLD AUTO: 208 10*3/UL (ref 140–440)
PLATELETS.RETICULATED NFR BLD AUTO: (no result) %
PMV BLD AUTO: 10.4 FL (ref 9.5–12.2)
POIKILOCYTOSIS BLD QL SMEAR: (no result)
POIKILOCYTOSIS BLD QL SMEAR: (no result)
POLYCHROMASIA BLD QL SMEAR: (no result)
RBC MORPH BLD: (no result)
ROULEAUX BLD QL SMEAR: (no result)
SCHISTOCYTES BLD QL SMEAR: (no result)
SICKLE CELLS BLD QL SMEAR: (no result)
SPHEROCYTES BLD QL SMEAR: (no result)
STOMATOCYTES BLD QL SMEAR: (no result)
TARGETS BLD QL SMEAR: (no result)
TOXIC GRANULES BLD QL SMEAR: (no result)
VARIANT LYMPHS BLD QL SMEAR: (no result)
WBC # BLD AUTO: 10.7 10*3/UL (ref 4.5–10)
WBC NRBC COR # BLD: (no result) K/UL
WBC TOXIC VACUOLES BLD QL SMEAR: (no result)

## 2025-07-02 PROCEDURE — 86901 BLOOD TYPING SEROLOGIC RH(D): CPT

## 2025-07-02 PROCEDURE — 86900 BLOOD TYPING SEROLOGIC ABO: CPT

## 2025-07-02 PROCEDURE — 86850 RBC ANTIBODY SCREEN: CPT

## 2025-07-02 PROCEDURE — 85025 COMPLETE CBC W/AUTO DIFF WBC: CPT

## 2025-07-02 PROCEDURE — 10907ZC DRAINAGE OF AMNIOTIC FLUID, THERAPEUTIC FROM PRODUCTS OF CONCEPTION, VIA NATURAL OR ARTIFICIAL OPENING: ICD-10-PCS

## 2025-07-02 PROCEDURE — 3E033VJ INTRODUCTION OF OTHER HORMONE INTO PERIPHERAL VEIN, PERCUTANEOUS APPROACH: ICD-10-PCS

## 2025-07-02 RX ADMIN — AMPICILLIN STA MLS/HR: 2 INJECTION, POWDER, FOR SOLUTION INTRAVENOUS at 06:32

## 2025-07-02 RX ADMIN — POTASSIUM CHLORIDE SCH MLS/HR: 14.9 INJECTION, SOLUTION INTRAVENOUS at 06:22

## 2025-07-02 RX ADMIN — AMPICILLIN SODIUM SCH MLS/HR: 1 INJECTION, POWDER, FOR SOLUTION INTRAMUSCULAR; INTRAVENOUS at 10:25

## 2025-07-02 RX ADMIN — DOCUSATE SODIUM AND SENNOSIDES SCH: 50; 8.6 TABLET ORAL at 21:11

## 2025-07-02 RX ADMIN — IBUPROFEN SCH MG: 800 TABLET, FILM COATED ORAL at 18:29

## 2025-07-02 RX ADMIN — OXYTOCIN-SODIUM CHLORIDE 0.9% IV SOLN 30 UNIT/500ML SCH MLS/HR: 30-0.9/5 SOLUTION at 06:34

## 2025-07-03 VITALS — HEART RATE: 64 BPM | SYSTOLIC BLOOD PRESSURE: 114 MMHG | DIASTOLIC BLOOD PRESSURE: 71 MMHG | TEMPERATURE: 97.6 F

## 2025-07-03 RX ADMIN — ACETAMINOPHEN SCH: 500 TABLET ORAL at 01:37
